# Patient Record
Sex: FEMALE | Race: WHITE | NOT HISPANIC OR LATINO | URBAN - METROPOLITAN AREA
[De-identification: names, ages, dates, MRNs, and addresses within clinical notes are randomized per-mention and may not be internally consistent; named-entity substitution may affect disease eponyms.]

---

## 2018-11-06 ENCOUNTER — OFFICE VISIT - RIVER FALLS (OUTPATIENT)
Dept: FAMILY MEDICINE | Facility: CLINIC | Age: 23
End: 2018-11-06

## 2018-11-06 ASSESSMENT — MIFFLIN-ST. JEOR: SCORE: 1242.46

## 2022-02-11 VITALS
SYSTOLIC BLOOD PRESSURE: 100 MMHG | HEIGHT: 62 IN | DIASTOLIC BLOOD PRESSURE: 60 MMHG | WEIGHT: 118 LBS | TEMPERATURE: 97.9 F | HEART RATE: 68 BPM | BODY MASS INDEX: 21.71 KG/M2

## 2022-02-15 NOTE — PROGRESS NOTES
Patient:   LAMBERT DRISCOLL            MRN: [id number]            FIN: [id number]               Age:   23 years     Sex:  Female     :  1995   Associated Diagnoses:   Vaginal bleeding; Positive pregnancy test   Author:   Jadyn Snyder MD      Chief Complaint   2018 8:23 AM CST    Period for 12 days; normal to light; cramping more the left than the right; not on birth control      History of Present Illness   patient with prolonged period, started 10/26  periods typically every 28-30 days, usually cramping for 2 days, bleeding for 5 days  this period had regular to light flow, cramping has also been fluctuating  patient is sexually active, uses condoms  has had some nausea, no vomiting  cramping is worse on left side than right  no significant pain         Health Status   Allergies:    Allergic Reactions (All)  No known allergies   Problem list:    All Problems  Allergy-induced asthma / SNOMED CT 5532109013 / Confirmed  Seasonal allergies / SNOMED CT 035266912 / Confirmed      Histories   Past Medical History:    No active or resolved past medical history items have been selected or recorded.   Family History:    No family history items have been selected or recorded.   Procedure history:    No active procedure history items have been selected or recorded.   Social History:        Employment and Education Assessment            Student        Physical Examination   Vital Signs   2018 8:23 AM CST Temperature Tympanic 97.9 DegF    Peripheral Pulse Rate 68 bpm    Pulse Site Radial artery    HR Method Manual    Systolic Blood Pressure 100 mmHg    Diastolic Blood Pressure 60 mmHg    Mean Arterial Pressure 73 mmHg    BP Site Right arm    BP Method Manual      Measurements from flowsheet : Measurements   2018 8:23 AM CST Height Measured - Standard 62.25 in    Weight Measured - Standard 118.0 lb    BSA 1.53 m2    Body Mass Index 21.41 kg/m2      General:  Alert and oriented, No acute distress.        Review / Management   Results review:  Lab results   11/6/2018 9:00 AM CST hCG Ql POC Positive    WBC TR 7.3 x10^3/uL    RBC TR 4.62 x10^6/uL    Hgb TR 14 g/dL    Hct TR 40.5 %    MCV TR 87.5 fL    MCH TR 30.2 pg    MCHC TR 34.6 gm/dL    RDW TR 12.3 %    Platelet  x10^3/uL    POC Test Comments POC Test Comments     .       Impression and Plan   Diagnosis     Vaginal bleeding (ATA96-XU N93.9).     Positive pregnancy test (GBF14-CD Z32.01).     Plan:  will get quant hcg, check pelvic ultrasound. Discussed if significant abdominal pain needs to be seen due to risk of ectopic pregnancy. Patient understands, no further questions..      addendum: called by ultrasound at 1330, shows no IUP, possible right adenexal fluid, blood in pelvis  discussed via phone with Dr. Cortes. Patent will be seen by Dr. Gimenez in [place] for beta hcg and further evaluation given concern for ectopic.

## 2022-02-15 NOTE — LETTER
(Inserted Image. Unable to display)   February 13, 2019      LAMBERT DRISCOLL   [address] [place], [place] [id number]        Dear LAMBERT,      Thank you for [place] [facility] [place] [place] [facility] [place].     Our records indicate you are due for the following services:     Annual Physical    To schedule an appointment or if you have further questions, please contact your primary clinic:   [facility]          [phone]   [facility]    [phone]             [facility]         [phone]      Powered by [employer]    Sincerely,    Jadyn Snyder M.D.